# Patient Record
Sex: FEMALE
[De-identification: names, ages, dates, MRNs, and addresses within clinical notes are randomized per-mention and may not be internally consistent; named-entity substitution may affect disease eponyms.]

---

## 2017-01-23 ENCOUNTER — HOSPITAL ENCOUNTER (OUTPATIENT)
Dept: HOSPITAL 75 - RAD | Age: 32
End: 2017-01-23
Attending: FAMILY MEDICINE
Payer: COMMERCIAL

## 2017-01-23 DIAGNOSIS — Z34.81: Primary | ICD-10-CM

## 2017-01-23 PROCEDURE — 76817 TRANSVAGINAL US OBSTETRIC: CPT

## 2017-01-23 PROCEDURE — 76801 OB US < 14 WKS SINGLE FETUS: CPT

## 2017-01-23 NOTE — DIAGNOSTIC IMAGING REPORT
EXAMINATION: OB ultrasound.



INDICATION: Check size and dates.



FINDINGS: There are no recent ultrasound examinations available

for comparison.



There is a gestational sac within the uterus containing a single

live fetus. Fetal heart motion is noted and a rate of 181 bpm is

recorded. The crown-rump length suggests the estimated

gestational age is 8 weeks 6 days +/- 1 week. There are no

obvious fetal abnormalities identified.



The amniotic fluid volume is within normal limits. At this time,

it is not certain where the placenta will develop.



Both ovaries are identified and are unremarkable. There is no

solid pelvic mass or free fluid collection noted.



IMPRESSION:

1. There is single live intrauterine pregnancy of approximately

8 weeks 6 days gestation +/- 1 week. The EDC is August 29, 2017.

2. There are no obvious fetal abnormalities identified. If a

more sensitive evaluation of the fetal anatomy is desired, then

a short-term (10-12 weeks) follow-up ultrasound exam should be

obtained.



Dictated by:



Dictated on workstation # [id number]

## 2017-04-07 ENCOUNTER — HOSPITAL ENCOUNTER (OUTPATIENT)
Dept: HOSPITAL 75 - RAD | Age: 32
End: 2017-04-07
Attending: FAMILY MEDICINE
Payer: COMMERCIAL

## 2017-04-07 DIAGNOSIS — Z3A.19: ICD-10-CM

## 2017-04-07 DIAGNOSIS — Z13.0: ICD-10-CM

## 2017-04-07 DIAGNOSIS — Z34.82: Primary | ICD-10-CM

## 2017-04-07 PROCEDURE — 76805 OB US >/= 14 WKS SNGL FETUS: CPT

## 2017-04-07 NOTE — DIAGNOSTIC IMAGING REPORT
INDICATION: Fetal assessment.



FINDINGS: Obstetrical ultrasonography reveals boucher

intrauterine gestation in transverse presentation. Amniotic

fluid level is unremarkable. There is fetal cardiac activity

present with a rate of 161 beats per minute. The placenta is

posterior and in a low position. No definite previa is

documented. No fetal anomaly is identified however four-chamber

view of the heart and three-vessel cord could not be documented.

Fetal biometry indicates gestational age of 19 weeks and 4 days.

This corresponds with age predicted by ultrasound examination of

01/23/2017.



IMPRESSION: Unremarkable obstetrical ultrasound with estimated

gestational age of 19 weeks and 3 days. There has been normal

progression since previous study. Additional anatomic survey

could be performed later in the second trimester for assessment

of the heart and umbilical cord.



Dictated by:



Dictated on workstation # [id number]

## 2017-05-10 ENCOUNTER — HOSPITAL ENCOUNTER (OUTPATIENT)
Dept: HOSPITAL 75 - RAD | Age: 32
End: 2017-05-10
Attending: FAMILY MEDICINE
Payer: COMMERCIAL

## 2017-05-10 DIAGNOSIS — Z34.92: Primary | ICD-10-CM

## 2017-05-10 DIAGNOSIS — Z36: ICD-10-CM

## 2017-05-10 PROCEDURE — 76816 OB US FOLLOW-UP PER FETUS: CPT

## 2017-05-10 NOTE — DIAGNOSTIC IMAGING REPORT
INDICATION: Incomplete fetal survey on previous study of

4/7/2017.



COMPARISON: Limited study performed and compared with 4/7/2017.



FINDINGS: On the prior study, the four-chamber heart view and

three-vessel cord could not be visualized. On today's study,

normal-appearing four-chamber heart view and three-vessel cord

were seen. Fetal heart rate is 130 beats per minute. The fetus is

in breech presentation at this time.



IMPRESSION:



Limited study performed to evaluate the structures which were not

well seen on the previous study of 4/7/2017. On today's study,

four-chamber heart view was unremarkable and there was a

three-vessel cord detected. The rest of the survey was not

repeated. Fetal heart rate is 130 beats per minute.



Dictated by: 



  Dictated on workstation # [id number]

## 2017-06-01 ENCOUNTER — HOSPITAL ENCOUNTER (OUTPATIENT)
Dept: HOSPITAL 75 - LAB | Age: 32
End: 2017-06-01
Attending: FAMILY MEDICINE
Payer: COMMERCIAL

## 2017-06-01 DIAGNOSIS — O99.810: Primary | ICD-10-CM

## 2017-06-01 PROCEDURE — 82962 GLUCOSE BLOOD TEST: CPT

## 2017-06-01 PROCEDURE — 36415 COLL VENOUS BLD VENIPUNCTURE: CPT

## 2017-06-01 PROCEDURE — 82951 GLUCOSE TOLERANCE TEST (GTT): CPT

## 2017-06-01 PROCEDURE — 82952 GTT-ADDED SAMPLES: CPT

## 2017-06-13 ENCOUNTER — HOSPITAL ENCOUNTER (OUTPATIENT)
Dept: HOSPITAL 75 - RAD | Age: 32
End: 2017-06-13
Attending: FAMILY MEDICINE
Payer: COMMERCIAL

## 2017-06-13 DIAGNOSIS — O44.43: Primary | ICD-10-CM

## 2017-06-13 DIAGNOSIS — Z3A.30: ICD-10-CM

## 2017-06-13 PROCEDURE — 76816 OB US FOLLOW-UP PER FETUS: CPT

## 2017-06-13 NOTE — DIAGNOSTIC IMAGING REPORT
INDICATION: Low-lying placenta, followup.



TECHNIQUE: Multiple real-time grayscale images were obtained over

the gravid uterus.



COMPARISON: 05/10/2017.



FINDINGS: Cervical length is approximately 6.7 cm. Fetus is

currently in a cephalic presentation. Placenta is posterior and

without previa. Normal amount of amniotic fluid.



Biometrical measurements are as follows:

Biparietal 7.8 cm, age 31 weeks 3 days.

Head circumference 29.1 cm, age 32 weeks 1 days.

Abdominal circumference 24.9 cm, age 29 weeks 1 days.

Femur length 5.7 cm, age 29 weeks 6 days.



Sonographic estimate age: 30 weeks 5 days.

Sonographic estimated date of delivery: 08-17-17.



Estimated Fetal Weight: 1458 gm (+/- 213  gm).

LMP percentile: 40%.



Fetal heart rate: 149 beats per minute.



Fetal number: 1 of 1.



IMPRESSION: Single viable intrauterine pregnancy, currently in a

cephalic presentation. Sonographically estimated age is 30 weeks

5 days for an estimated date of delivery of August 17, 2017. This

is approximately 12 days advanced compared to initial baseline

ultrasound imaging.



Dictated by: 



  Dictated on workstation # [id number]

## 2017-08-23 ENCOUNTER — HOSPITAL ENCOUNTER (INPATIENT)
Dept: HOSPITAL 75 - LDRP | Age: 32
LOS: 1 days | Discharge: HOME | End: 2017-08-24
Attending: FAMILY MEDICINE | Admitting: FAMILY MEDICINE
Payer: COMMERCIAL

## 2017-08-23 VITALS — DIASTOLIC BLOOD PRESSURE: 68 MMHG | SYSTOLIC BLOOD PRESSURE: 120 MMHG

## 2017-08-23 VITALS — SYSTOLIC BLOOD PRESSURE: 126 MMHG | DIASTOLIC BLOOD PRESSURE: 71 MMHG

## 2017-08-23 VITALS — DIASTOLIC BLOOD PRESSURE: 74 MMHG | SYSTOLIC BLOOD PRESSURE: 124 MMHG

## 2017-08-23 VITALS — SYSTOLIC BLOOD PRESSURE: 102 MMHG | DIASTOLIC BLOOD PRESSURE: 55 MMHG

## 2017-08-23 VITALS — DIASTOLIC BLOOD PRESSURE: 69 MMHG | SYSTOLIC BLOOD PRESSURE: 121 MMHG

## 2017-08-23 VITALS — WEIGHT: 190.25 LBS | HEIGHT: 61 IN | BODY MASS INDEX: 35.92 KG/M2

## 2017-08-23 VITALS — DIASTOLIC BLOOD PRESSURE: 66 MMHG | SYSTOLIC BLOOD PRESSURE: 119 MMHG

## 2017-08-23 VITALS — DIASTOLIC BLOOD PRESSURE: 58 MMHG | SYSTOLIC BLOOD PRESSURE: 125 MMHG

## 2017-08-23 VITALS — DIASTOLIC BLOOD PRESSURE: 70 MMHG | SYSTOLIC BLOOD PRESSURE: 121 MMHG

## 2017-08-23 VITALS — SYSTOLIC BLOOD PRESSURE: 124 MMHG | DIASTOLIC BLOOD PRESSURE: 58 MMHG

## 2017-08-23 VITALS — DIASTOLIC BLOOD PRESSURE: 70 MMHG | SYSTOLIC BLOOD PRESSURE: 131 MMHG

## 2017-08-23 VITALS — DIASTOLIC BLOOD PRESSURE: 70 MMHG | SYSTOLIC BLOOD PRESSURE: 113 MMHG

## 2017-08-23 VITALS — DIASTOLIC BLOOD PRESSURE: 69 MMHG | SYSTOLIC BLOOD PRESSURE: 119 MMHG

## 2017-08-23 VITALS — SYSTOLIC BLOOD PRESSURE: 114 MMHG | DIASTOLIC BLOOD PRESSURE: 62 MMHG

## 2017-08-23 VITALS — DIASTOLIC BLOOD PRESSURE: 74 MMHG | SYSTOLIC BLOOD PRESSURE: 131 MMHG

## 2017-08-23 VITALS — SYSTOLIC BLOOD PRESSURE: 111 MMHG | DIASTOLIC BLOOD PRESSURE: 65 MMHG

## 2017-08-23 VITALS — SYSTOLIC BLOOD PRESSURE: 114 MMHG | DIASTOLIC BLOOD PRESSURE: 60 MMHG

## 2017-08-23 VITALS — SYSTOLIC BLOOD PRESSURE: 113 MMHG | DIASTOLIC BLOOD PRESSURE: 64 MMHG

## 2017-08-23 VITALS — DIASTOLIC BLOOD PRESSURE: 82 MMHG | SYSTOLIC BLOOD PRESSURE: 114 MMHG

## 2017-08-23 VITALS — SYSTOLIC BLOOD PRESSURE: 125 MMHG | DIASTOLIC BLOOD PRESSURE: 73 MMHG

## 2017-08-23 VITALS — SYSTOLIC BLOOD PRESSURE: 119 MMHG | DIASTOLIC BLOOD PRESSURE: 65 MMHG

## 2017-08-23 VITALS — SYSTOLIC BLOOD PRESSURE: 112 MMHG | DIASTOLIC BLOOD PRESSURE: 62 MMHG

## 2017-08-23 VITALS — DIASTOLIC BLOOD PRESSURE: 69 MMHG | SYSTOLIC BLOOD PRESSURE: 124 MMHG

## 2017-08-23 VITALS — DIASTOLIC BLOOD PRESSURE: 63 MMHG | SYSTOLIC BLOOD PRESSURE: 120 MMHG

## 2017-08-23 VITALS — SYSTOLIC BLOOD PRESSURE: 114 MMHG | DIASTOLIC BLOOD PRESSURE: 64 MMHG

## 2017-08-23 VITALS — SYSTOLIC BLOOD PRESSURE: 113 MMHG | DIASTOLIC BLOOD PRESSURE: 63 MMHG

## 2017-08-23 VITALS — SYSTOLIC BLOOD PRESSURE: 125 MMHG | DIASTOLIC BLOOD PRESSURE: 67 MMHG

## 2017-08-23 VITALS — DIASTOLIC BLOOD PRESSURE: 66 MMHG | SYSTOLIC BLOOD PRESSURE: 114 MMHG

## 2017-08-23 VITALS — SYSTOLIC BLOOD PRESSURE: 134 MMHG | DIASTOLIC BLOOD PRESSURE: 77 MMHG

## 2017-08-23 VITALS — DIASTOLIC BLOOD PRESSURE: 75 MMHG | SYSTOLIC BLOOD PRESSURE: 119 MMHG

## 2017-08-23 VITALS — SYSTOLIC BLOOD PRESSURE: 131 MMHG | DIASTOLIC BLOOD PRESSURE: 70 MMHG

## 2017-08-23 VITALS — SYSTOLIC BLOOD PRESSURE: 124 MMHG | DIASTOLIC BLOOD PRESSURE: 72 MMHG

## 2017-08-23 VITALS — SYSTOLIC BLOOD PRESSURE: 118 MMHG | DIASTOLIC BLOOD PRESSURE: 68 MMHG

## 2017-08-23 VITALS — SYSTOLIC BLOOD PRESSURE: 111 MMHG | DIASTOLIC BLOOD PRESSURE: 64 MMHG

## 2017-08-23 VITALS — DIASTOLIC BLOOD PRESSURE: 72 MMHG | SYSTOLIC BLOOD PRESSURE: 124 MMHG

## 2017-08-23 VITALS — SYSTOLIC BLOOD PRESSURE: 126 MMHG | DIASTOLIC BLOOD PRESSURE: 68 MMHG

## 2017-08-23 VITALS — DIASTOLIC BLOOD PRESSURE: 71 MMHG | SYSTOLIC BLOOD PRESSURE: 114 MMHG

## 2017-08-23 VITALS — DIASTOLIC BLOOD PRESSURE: 55 MMHG | SYSTOLIC BLOOD PRESSURE: 112 MMHG

## 2017-08-23 VITALS — SYSTOLIC BLOOD PRESSURE: 139 MMHG | DIASTOLIC BLOOD PRESSURE: 63 MMHG

## 2017-08-23 VITALS — SYSTOLIC BLOOD PRESSURE: 105 MMHG | DIASTOLIC BLOOD PRESSURE: 57 MMHG

## 2017-08-23 VITALS — DIASTOLIC BLOOD PRESSURE: 67 MMHG | SYSTOLIC BLOOD PRESSURE: 113 MMHG

## 2017-08-23 VITALS — DIASTOLIC BLOOD PRESSURE: 69 MMHG | SYSTOLIC BLOOD PRESSURE: 117 MMHG

## 2017-08-23 VITALS — SYSTOLIC BLOOD PRESSURE: 128 MMHG | DIASTOLIC BLOOD PRESSURE: 68 MMHG

## 2017-08-23 VITALS — SYSTOLIC BLOOD PRESSURE: 115 MMHG | DIASTOLIC BLOOD PRESSURE: 66 MMHG

## 2017-08-23 VITALS — DIASTOLIC BLOOD PRESSURE: 60 MMHG | SYSTOLIC BLOOD PRESSURE: 131 MMHG

## 2017-08-23 VITALS — DIASTOLIC BLOOD PRESSURE: 68 MMHG | SYSTOLIC BLOOD PRESSURE: 126 MMHG

## 2017-08-23 VITALS — DIASTOLIC BLOOD PRESSURE: 71 MMHG | SYSTOLIC BLOOD PRESSURE: 121 MMHG

## 2017-08-23 VITALS — SYSTOLIC BLOOD PRESSURE: 113 MMHG | DIASTOLIC BLOOD PRESSURE: 62 MMHG

## 2017-08-23 DIAGNOSIS — O13.3: Primary | ICD-10-CM

## 2017-08-23 DIAGNOSIS — Z3A.39: ICD-10-CM

## 2017-08-23 LAB
BASOPHILS # BLD AUTO: 0 10^3/UL (ref 0–0.1)
BASOPHILS NFR BLD AUTO: 0 % (ref 0–10)
EOSINOPHIL # BLD AUTO: 0.1 10^3/UL (ref 0–0.3)
EOSINOPHIL NFR BLD AUTO: 1 % (ref 0–10)
ERYTHROCYTE [DISTWIDTH] IN BLOOD BY AUTOMATED COUNT: 16.7 % (ref 10–14.5)
LYMPHOCYTES # BLD AUTO: 2.3 X 10^3 (ref 1–4)
LYMPHOCYTES NFR BLD AUTO: 22 % (ref 12–44)
MCH RBC QN AUTO: 27 PG (ref 25–34)
MCHC RBC AUTO-ENTMCNC: 33 G/DL (ref 32–36)
MCV RBC AUTO: 81 FL (ref 80–99)
MONOCYTES # BLD AUTO: 1.1 X 10^3 (ref 0–1)
MONOCYTES NFR BLD AUTO: 11 % (ref 0–12)
NEUTROPHILS # BLD AUTO: 6.7 X 10^3 (ref 1.8–7.8)
NEUTROPHILS NFR BLD AUTO: 66 % (ref 42–75)
PLATELET # BLD: 190 10^3/UL (ref 130–400)
PMV BLD AUTO: 9.4 FL (ref 7.4–10.4)
RBC # BLD AUTO: 4.6 10^6/UL (ref 4.35–5.85)
WBC # BLD AUTO: 10.2 10^3/UL (ref 4.3–11)

## 2017-08-23 PROCEDURE — 86901 BLOOD TYPING SEROLOGIC RH(D): CPT

## 2017-08-23 PROCEDURE — 86900 BLOOD TYPING SEROLOGIC ABO: CPT

## 2017-08-23 PROCEDURE — 85025 COMPLETE CBC W/AUTO DIFF WBC: CPT

## 2017-08-23 PROCEDURE — 36415 COLL VENOUS BLD VENIPUNCTURE: CPT

## 2017-08-23 PROCEDURE — 0HQ9XZZ REPAIR PERINEUM SKIN, EXTERNAL APPROACH: ICD-10-PCS | Performed by: FAMILY MEDICINE

## 2017-08-23 PROCEDURE — 86850 RBC ANTIBODY SCREEN: CPT

## 2017-08-23 RX ADMIN — SODIUM CHLORIDE, SODIUM LACTATE, POTASSIUM CHLORIDE, CALCIUM CHLORIDE, AND DEXTROSE MONOHYDRATE SCH MLS/HR: 600; 310; 30; 20; 5 INJECTION, SOLUTION INTRAVENOUS at 14:31

## 2017-08-23 RX ADMIN — IBUPROFEN SCH MG: 600 TABLET ORAL at 22:27

## 2017-08-23 RX ADMIN — SODIUM CHLORIDE, SODIUM LACTATE, POTASSIUM CHLORIDE, CALCIUM CHLORIDE, AND DEXTROSE MONOHYDRATE SCH MLS/HR: 600; 310; 30; 20; 5 INJECTION, SOLUTION INTRAVENOUS at 06:35

## 2017-08-23 NOTE — OB LABOR & DELIVERY RECORD
Vag Delivery Note


Vag Delivery Note


Date of Delivery: 17 





Preoperative Diagnosis: Jennyfer Hernandez  is a (30  /Para  2 / 1,

Gestational Age (wks)39with 6d





Postoperative Diagnosis: Same


Surgeon: OSCAR CORMIER 





Assistant: Tita Yates, MS3





Anesthesia: epidural





Delivery Type: spontaneous vaginal





Findings: []


Viable male infant, apgars 8/9, weight 9#0


Lacerations: first degree perineal, bilateral periurethral abrasions


Intact placenta with 3 vessel cord. No nuchal cord, body cord or shoulder 

dystocia





Estimated Blood Loss: 250 ml





Complications: None





Condition: Stable





Description of Procedure:





The patient is a 33 yo G2 now P2 who presented for IOL. She was admitted and 

informed consent was obtained. Her labor course was unremarkable. She 

progressed to complete dilatation and began to push. 





She was then set up for delivery. The infant's head was delivered 

atraumatically in the JAD position. The shoulders and remainder of the infant's 

body were then delivered without difficulty. Upon delivery, the infant was 

vigorous and placed on maternal abdomen. After a brief delay the cord was 

doubly clamped and cut and the infant was handed off to the pediatric staff. An 

intact placenta with 3-vessel cord delivered via Teresa and there was found to 

be minimal bleeding.~ Vigorous fundal massage was performed and the fundus was 

found to be firm. IV oxytocin was given. Examination of the vagina and perineum 

revealed a first degree perineal laceration repaired in the usual fashion with 3

-0 rapide suture. Following the repair, sponge, instrument and needle counts 

were correct. Mom and baby were both in stable condition in the labor suite.





Vitals - Labs


Vital Signs - I&O





Vital Signs








 17





 14:15 16:00


 


Temp 96.4 


 


Pulse  72


 


Resp  18


 


B/P (MAP)  125/73


 


Pulse Ox  100


 


O2 Delivery  Room Air











Labs


Laboratory Tests


17 06:46: 


White Blood Count 10.2, Red Blood Count 4.60, Hemoglobin 12.3, Hematocrit 37, 

Mean Corpuscular Volume 81, Mean Corpuscular Hemoglobin 27, Mean Corpuscular 

Hemoglobin Concent 33, Red Cell Distribution Width 16.7H, Platelet Count 190, 

Mean Platelet Volume 9.4, Neutrophils (%) (Auto) 66, Lymphocytes (%) (Auto) 22, 

Monocytes (%) (Auto) 11, Eosinophils (%) (Auto) 1, Basophils (%) (Auto) 0, 

Neutrophils # (Auto) 6.7, Lymphocytes # (Auto) 2.3, Monocytes # (Auto) 1.1H, 

Eosinophils # (Auto) 0.1, Basophils # (Auto) 0.0











OSCAR CORMIER MD Aug 23, 2017 6:09 pm

## 2017-08-23 NOTE — XMS REPORT
[facility]

 Created on: 2016



Jennyfer Aaron

External Reference #: [id number]

: 1985

Sex: Female



Demographics







 Address  [address] [place]  [address]

 

 Home Phone  [phone]

 

 Preferred Language  Unknown

 

 Marital Status  Unknown

 

 Taoism Affiliation  Unknown

 

 Race  White

 

 Ethnic Group   or 





Author







 Author  NILAM MCCLAIN

 

 Organization  [employer]

 

 Address  Unknown

 

 Phone  Unavailable







Care Team Providers







 Care Team Member Name  Role  Phone

 

 NILAM MCCLAIN  CP  Unavailable



                                                                



Allergies, Adverse Reactions, Alerts

          





 Substance  Reaction  Event Type

 

 N.K.D.A.  Info Not Available  Non Drug Allergy



                                                                               
         



Problems

          





 Problem Type  Condition  Code  Onset Dates  Condition Status

 

 Problem  Pregnant state, incidental  V22.2     Active

 

 Problem  Unspecified pruritic disorder  698.9     Active

 

 Problem  Rash and other nonspecific skin eruption  782.1     Active

 

 Problem  Acute sinusitis, unspecified  461.9     Active

 

 Problem  Screening examination for venereal disease  V74.5     Active

 

 Problem  Surveillance of other previously prescribed contraceptive method  
V25.49     Active

 

 Problem  Screening examination for pulmonary tuberculosis  V74.1     Active

 

 Problem  Ingrowing nail  703.0     Active

 

 Problem  Encounter for dental examination and cleaning without abnormal 
findings  Z01.20     Active

 

 Problem  Routine postpartum follow-up  V24.2     Active

 

 Problem  Screening for malignant neoplasm of the cervix  V76.2     Active

 

 Problem  Other malaise and fatigue  780.79     Active

 

 Problem  Unspecified contraceptive management  V25.9     Active

 

 Problem  Dysuria  788.1     Active

 

 Problem  Elevated blood pressure reading without diagnosis of hypertension  
796.2     Active

 

 Problem  Unspecified breast screening  V76.10     Active

 

 Problem  Routine gynecological examination  V72.31     Active

 

 Problem  Unspecified constipation  564.00     Active

 

 Problem  Abdominal pain, left lower quadrant  789.04     Active

 

 Problem  General counseling for prescription of oral contraceptives  V25.01   
  Active

 

 Problem  Acute upper respiratory infections of unspecified site  465.9     
Active

 

 Assessment  Encounter for dental examination and cleaning without abnormal 
findings  Z01.20     Active

 

 Problem  Unspecified antepartum hemorrhage, unspecified as to episode of care  
641.90     Active

 

 Problem  Headache  784.0     Active



                                                                               
                                                                               
                                                                               
                                                                               
  



Medications

          No Known Medications                                                 
                                       



Procedures

          





 Procedure  Coding System  Code  Date

 

 INTRAORL-PERIAPICAL 1 FILM 69265  CPT-4    Dec 30, 2015

 

 PROPHYLAXIS - ADULT  CPT-4    Dec 30, 2015

 

 PERIODIC ORAL EXAMINATION  CPT-4    Dec 30, 2015



                                                                               
                                       



Vital Signs

          





 Date/Time:  Dec 30, 2015

 

 Blood Pressure Diastolic  71 mmHg

 

 Blood Pressure Systolic  115 mmHg

 

 Height  60 in



                                                                              



Results

          No Known Results                                                     
               



Summary Purpose

          [employer] Submission

## 2017-08-23 NOTE — XMS REPORT
Continuity of Care Document

 Created on: 2017



BRANDON MAO

External Reference #: [id number]

: 1985

Sex: Female



Demographics







 Address  [address] [place] [address]  [address]

 

 Home Phone  [phone] x

 

 Preferred Language  Unknown

 

 Marital Status  Unknown

 

 Worship Affiliation  Unknown

 

 Race  Unknown

 

 Ethnic Group  Unknown





Author







 Author  [facility]

 

 Organization  [facility]

 

 Address  Unknown

 

 Phone  Unavailable



                                                      



Allergies

                      





 Active                    Description                    Code                  
  Type                    Severity                    Reaction                  
  Onset                    Reported/Identified                    Relationship 
to Patient                    Clinical Status                

 

 Yes                    No Known Drug Allergies                    [id number]  
                  Drug Allergy                    Unknown                    N/
A                                         2012                           
                               



                                                                               
         



Medications

                                                                               
         



Problems

                      





 Date Dx Coded                    Attending                    Type            
        Code                    Diagnosis                    Diagnosed By      
          

 

 2011                                                              V04.81
                    Flu Dx (3 Yrs And Above, Im)                               
      

 

 2011                                                              V22.0 
                   Pregnancy, Normal First                                     

 

 2011                                                              V04.81
                    Flu Dx (3 Yrs And Above, Im)                               
      

 

 2011                                                              V22.0 
                   Pregnancy, Normal First                                     

 

 2011                    RAJOTTE APRN, SHWETA A                          
               V04.81                    Flu Dx (3 Yrs And Above, Im)          
                           

 

 2011                    RAJOTTE APRN, SHWETA A                          
               V22.0                    Pregnancy, Normal First                
                     

 

 2011                    RAJOTTE APRN, SHWETA A                          
               V04.81                    Flu Dx (3 Yrs And Above, Im)          
                           

 

 2011                    RAJOTTE APRN, SHWETA A                          
               V22.0                    Pregnancy, Normal First                
                     

 

 2011                    COSME DO, SONIA K                                
         V04.81                    Flu Dx (3 Yrs And Above, Im)                
                     

 

 2011                    COSME DO, SONIA K                                
         V22.0                    Pregnancy, Normal First                      
               

 

 2011                    RAJOTTE APRN, SHWETA A                          
               V04.81                    Flu Dx (3 Yrs And Above, Im)          
                           

 

 2011                    RAJOTTE APRN, SHWETA A                          
               V22.0                    Pregnancy, Normal First                
                     

 

 2011                    DONELL APRN, ANA A                            
             V04.81                    Flu Dx (3 Yrs And Above, Im)            
                         

 

 2011                    DONELL APRN, NAA A                            
             V22.0                    Pregnancy, Normal First                  
                   

 

 2011                                                              V72.31
                    Gyn Exam, Routine                                     

 

 2011                                                              V74.5 
                   Std Screen                                     

 

 2011                                                              V72.31
                    Gyn Exam, Routine                                     

 

 2011                                                              V74.5 
                   Std Screen                                     

 

 2011                    RAJOTTE APRN, SHWETA A                          
               V72.31                    Gyn Exam, Routine                     
                

 

 2011                    RAJOTTE APRN, SHWETA A                          
               V74.5                    Std Screen                             
        

 

 2011                    RAJOTTE APRN, SHWETA A                          
               V72.31                    Gyn Exam, Routine                     
                

 

 2011                    RAJOTTE APRN, SHWETA A                          
               V74.5                    Std Screen                             
        

 

 2011                    SONIA COSME DO                                
         V72.31                    Gyn Exam, Routine                           
          

 

 2011                    COSME SONIA PEREZ K                                
         V74.5                    Std Screen                                   
  

 

 2011                    RAJOTTE APRN, SHWETA A                          
               V72.31                    Gyn Exam, Routine                     
                

 

 2011                    RAJOTTE APRN, SHWETA A                          
               V74.5                    Std Screen                             
        

 

 2011                    DONELL APRROSY, ANA A                            
             V72.31                    Gyn Exam, Routine                       
              

 

 2011                    DONELL APRN, ANA A                            
             V74.5                    Std Screen                               
      

 

 2012                                                              641.90
                    Compl Of Pregnancy - Bleeding                              
       

 

 2012                                                              641.90
                    Compl Of Pregnancy - Bleeding                              
       

 

 2012                    RAJOTTE APRN, SHWETA A                          
               641.90                    Compl Of Pregnancy - Bleeding         
                            

 

 2012                    RAJOTTE APRN, SHWETA A                          
               641.90                    Compl Of Pregnancy - Bleeding         
                            

 

 2012                    SONIA COSME DO                                
         641.90                    Compl Of Pregnancy - Bleeding               
                      

 

 2012                    RAJOTTE APRN, SHWETA A                          
               641.90                    Compl Of Pregnancy - Bleeding         
                            

 

 2012                    DONELL APRN, ANA A                            
             641.90                    Compl Of Pregnancy - Bleeding           
                          

 

 2012                                                              698.9 
                   Pruritus Nos                                     

 

 2012                                                              782.1 
                   Rash                                     

 

 2012                                                              784.0 
                   Headache                                     

 

 2012                                                              V22.2 
                   Pregnant Incidental                                     

 

 2012                                                              698.9 
                   Pruritus Nos                                     

 

 2012                                                              782.1 
                   Rash                                     

 

 2012                                                              784.0 
                   Headache                                     

 

 2012                                                              V22.2 
                   Pregnant Incidental                                     

 

 2012                    RAJOTTE APRN, SHWETA A                          
               698.9                    Pruritus Nos                           
          

 

 2012                    RAJOTTE APRN, SHWETA A                          
               782.1                    Rash                                   
  

 

 2012                    RAJOTTE APRN, SHWETA A                          
               784.0                    Headache                               
      

 

 2012                    RAJOTTE APRN, SHWETA A                          
               V22.2                    Pregnant Incidental                    
                 

 

 2012                    RAJOTTE APRN, SHWETA A                          
               698.9                    Pruritus Nos                           
          

 

 2012                    RAJOTTE APRN, SHWETA A                          
               782.1                    Rash                                   
  

 

 2012                    RAJOTTE APRN, SHWETA A                          
               784.0                    Headache                               
      

 

 2012                    RAJOTTE APRN, SHWETA A                          
               V22.2                    Pregnant Incidental                    
                 

 

 2012                    COSME DO, SONIA K                                
         698.9                    Pruritus Nos                                 
    

 

 2012                    COSME DO, SONIA K                                
         782.1                    Rash                                     

 

 2012                    COSME DO, SONIA K                                
         784.0                    Headache                                     

 

 2012                    COSME DO, SONIA K                                
         V22.2                    Pregnant Incidental                          
           

 

 2012                    RAJOTTE APRN, SHWETA A                          
               698.9                    Pruritus Nos                           
          

 

 2012                    RAJOTTE APRN, SHWETA A                          
               782.1                    Rash                                   
  

 

 2012                    RAJOTTE APRN, SHWETA A                          
               784.0                    Headache                               
      

 

 2012                    RAJOTTE APRN, SHWETA A                          
               V22.2                    Pregnant Incidental                    
                 

 

 2012                    DONELL APRN, ANA A                            
             698.9                    Pruritus Nos                             
        

 

 2012                    DONELL APRN, ANA A                            
             782.1                    Rash                                     

 

 2012                    DONELL APRN, ANA A                            
             784.0                    Headache                                 
    

 

 2012                    DONELL APRN, ANA A                            
             V22.2                    Pregnant Incidental                      
               

 

 2012                                                              796.2 
                   ELEVATED BLOOD PRESSURE READING WITHOUT DIAGNOSIS OF 
HYPERTENSION                                     

 

 2012                                                              796.2 
                   ELEVATED BLOOD PRESSURE READING WITHOUT DIAGNOSIS OF 
HYPERTENSION                                     

 

 2012                    RAJALEKE APRN, SHWETA A                          
               796.2                    ELEVATED BLOOD PRESSURE READING WITHOUT 
DIAGNOSIS OF HYPERTENSION                                     

 

 2012                    RAJOTTE APRN, SHWETA A                          
               796.2                    ELEVATED BLOOD PRESSURE READING WITHOUT 
DIAGNOSIS OF HYPERTENSION                                     

 

 2012                    COSME DO, SONIA K                                
         796.2                    ELEVATED BLOOD PRESSURE READING WITHOUT 
DIAGNOSIS OF HYPERTENSION                                     

 

 2012                    RAJOTTE APRN, SHWETA A                          
               796.2                    ELEVATED BLOOD PRESSURE READING WITHOUT 
DIAGNOSIS OF HYPERTENSION                                     

 

 2012                    DONELL APRN, ANA A                            
             796.2                    ELEVATED BLOOD PRESSURE READING WITHOUT 
DIAGNOSIS OF HYPERTENSION                                     

 

 2012                                                              780.79
                    fatigue                                     

 

 2012                                                              V24.2 
                   POSTPARTUM F/U, ROUTINE                                     

 

 2012                                                              V25.49
                    CONTRACEPTION SURVEILLANCE (REPEAT RX)                     
                

 

 2012                                                              V25.9 
                   CONTRACEPTION MANAGEMENT                                     

 

 2012                                                              V76.2 
                   CERVICAL CANCER SCREENING (PAP SMEAR)                       
              

 

 2012                                                              780.79
                    fatigue                                     

 

 2012                                                              V24.2 
                   POSTPARTUM F/U, ROUTINE                                     

 

 2012                                                              V25.49
                    CONTRACEPTION SURVEILLANCE (REPEAT RX)                     
                

 

 2012                                                              V25.9 
                   CONTRACEPTION MANAGEMENT                                     

 

 2012                                                              V76.2 
                   CERVICAL CANCER SCREENING (PAP SMEAR)                       
              

 

 2012                    FACUNDO WU SHWETA A                          
               780.79                    fatigue                               
      

 

 2012                    FACUNDO WU SHWETA A                          
               V24.2                    POSTPARTUM F/U, ROUTINE                
                     

 

 2012                    FACUNDO WU SHWETA A                          
               V25.49                    CONTRACEPTION SURVEILLANCE (REPEAT RX)
                                     

 

 2012                    FACUNDO WU SHWETA A                          
               V25.9                    CONTRACEPTION MANAGEMENT               
                      

 

 2012                    FACUNDO WU SHWETA A                          
               V76.2                    CERVICAL CANCER SCREENING (PAP SMEAR)  
                                   

 

 2012                    FACUNDO WU SHWETA A                          
               780.79                    fatigue                               
      

 

 2012                    FACUNDO WU SHWETA A                          
               V24.2                    POSTPARTUM F/U, ROUTINE                
                     

 

 2012                    FACUNDO WU SHWETA A                          
               V25.49                    CONTRACEPTION SURVEILLANCE (REPEAT RX)
                                     

 

 2012                    FACUNDO WU SHWETA A                          
               V25.9                    CONTRACEPTION MANAGEMENT               
                      

 

 2012                    FACUNDO WU SHWETA A                          
               V76.2                    CERVICAL CANCER SCREENING (PAP SMEAR)  
                                   

 

 2012                    COSME DO SONIA K                                
         780.79                    fatigue                                     

 

 2012                    COSME DO SONIA K                                
         V24.2                    POSTPARTUM F/U, ROUTINE                      
               

 

 2012                    COSME DO SONIA K                                
         V25.49                    CONTRACEPTION SURVEILLANCE (REPEAT RX)      
                               

 

 2012                    COSME DO SONIA K                                
         V25.9                    CONTRACEPTION MANAGEMENT                     
                

 

 2012                    COSME DO SONIA K                                
         V76.2                    CERVICAL CANCER SCREENING (PAP SMEAR)        
                             

 

 2012                    FACUNDO WU SHWETA A                          
               780.79                    fatigue                               
      

 

 2012                    FACUNDO WU SHWETA A                          
               V24.2                    POSTPARTUM F/U, ROUTINE                
                     

 

 2012                    FACUNDO WU SHWETA A                          
               V25.49                    CONTRACEPTION SURVEILLANCE (REPEAT RX)
                                     

 

 2012                    FACUNDO WU SHWETA A                          
               V25.9                    CONTRACEPTION MANAGEMENT               
                      

 

 2012                    SHWETA KRAMER                          
               V76.2                    CERVICAL CANCER SCREENING (PAP SMEAR)  
                                   

 

 2012                    ANA LUZ                            
             780.79                    fatigue                                 
    

 

 2012                    ANA LUZ                            
             V24.2                    POSTPARTUM F/U, ROUTINE                  
                   

 

 2012                    ANA LUZ                            
             V25.49                    CONTRACEPTION SURVEILLANCE (REPEAT RX)  
                                   

 

 2012                    ANA LUZ                            
             V25.9                    CONTRACEPTION MANAGEMENT                 
                    

 

 2012                    ANA LUZ                            
             V76.2                    CERVICAL CANCER SCREENING (PAP SMEAR)    
                                 

 

 2012                                                              788.1 
                   DYSURIA                                     

 

 2012                                                              788.1 
                   DYSURIA                                     

 

 2012                    SHWETA KRAMER                          
               788.1                    DYSURIA                                
     

 

 2012                    SHWETA KRAMER                          
               788.1                    DYSURIA                                
     

 

 2012                    SONIA COSME DO                                
         788.1                    DYSURIA                                     

 

 2012                    SHWETA KRAMER                          
               788.1                    DYSURIA                                
     

 

 2012                    ANA LUZ                            
             788.1                    DYSURIA                                  
   

 

 2012                                                              V25.01
                    CONTRACEPTION - ORAL CONTRACEPTION                         
            

 

 2012                                                              V25.01
                    CONTRACEPTION - ORAL CONTRACEPTION                         
            

 

 2012                    SHWETA KRAMER                          
               V25.01                    CONTRACEPTION - ORAL CONTRACEPTION    
                                 

 

 2012                    SHWETA KRAMER                          
               V25.01                    CONTRACEPTION - ORAL CONTRACEPTION    
                                 

 

 2012                    SONIA COSME DO                                
         V25.01                    CONTRACEPTION - ORAL CONTRACEPTION          
                           

 

 2012                    SHWETA KRAMER                          
               V25.01                    CONTRACEPTION - ORAL CONTRACEPTION    
                                 

 

 2012                    ANA LUZ                            
             V25.01                    CONTRACEPTION - ORAL CONTRACEPTION      
                               

 

 2013                                                              465.9 
                   UPPER RESPIRATORY INFECTION                                 
    

 

 2013                                                              465.9 
                   UPPER RESPIRATORY INFECTION                                 
    

 

 2013                    SHWETA KRAMER                          
               465.9                    UPPER RESPIRATORY INFECTION            
                         

 

 2013                    SHWETA KRAMER                          
               465.9                    UPPER RESPIRATORY INFECTION            
                         

 

 2013                    SONIA COSME DO                                
         465.9                    UPPER RESPIRATORY INFECTION                  
                   

 

 2013                    SHWETA KRAMER                          
               465.9                    UPPER RESPIRATORY INFECTION            
                         

 

 2013                    DONELL APRN, ANA A                            
             465.9                    UPPER RESPIRATORY INFECTION              
                       

 

 2013                                                              703.0 
                   INGROWING TOENAIL                                     

 

 2013                    FACUNDO BULLOCKN, SHWETA A                          
               703.0                    INGROWING TOENAIL                      
               

 

 2013                    FACUNDO APRN, SHWETA A                          
               703.0                    INGROWING TOENAIL                      
               

 

 2013                    COSME DO, SONIA K                                
         703.0                    INGROWING TOENAIL                            
         

 

 2013                    FACUNDO APRN, SHWETA A                          
               703.0                    INGROWING TOENAIL                      
               

 

 2013                    DONELL BULLOCKN, ANA A                            
             703.0                    INGROWING TOENAIL                        
             

 

 10/13/2014                    COSME DO, SONIA K                                
         V74.1                    TB SCREENING                                 
    

 

 10/13/2014                    [place] APRROSY, SHWETA A                          
               V74.1                    TB SCREENING                           
          

 

 10/13/2014                    DONELL BULLOCKROSY, ANA A                            
             V74.1                    TB SCREENING                             
        

 

 2015                    FACUNDO BULLOCKROSY, SHWETA A                          
               564.00                    CONSTIPATION                          
           

 

 2015                    [place] APRROSY, SHWTEA A                          
               789.04                    ABDOMINAL PAIN LEFT LOWER QUADRANT    
                                 

 

 2015                    DONELL APRN, ANA A                            
             564.00                    CONSTIPATION                            
         

 

 2015                    DONELLELODIA BULLOCKROSY, ANA A                            
             789.04                    ABDOMINAL PAIN LEFT LOWER QUADRANT      
                               

 

 2015                    MARTINA PETTY, SHERI VOGEL                    Ot         
           599.70                                                          

 

 2015                    MARTINA PETTY, SHERI VOGEL                    Ot         
           625.9                                                          

 

 2015                    DONELL APRN, ANA A                            
             V72.31                    GYN EXAM, ROUTINE                       
              

 

 2015                    DONELL APRN, ANA A                            
             V74.5                    STD SCREEN                               
      

 

 2015                    DONELL APRN, ANA A                            
             V76.10                    BREAST CANCER SCREENING                 
                    

 

 10/26/2016                    NATHALY PETTY, RADHA N                    Ot        
            L68.0                    HIRSUTISM                                 
    

 

 10/26/2016                    NATHALY PETTY, RADHA N                    Ot        
            N91.4                    SECONDARY OLIGOMENORRHEA                  
                   

 

 10/26/2016                    NATHALY PETTY, RADHA N                    Ot        
            L68.0                    HIRSUTISM                                 
    

 

 10/26/2016                    NATHALY PETTY, RADHA N                    Ot        
            N91.4                    SECONDARY OLIGOMENORRHEA                  
                   

 

 10/26/2016                    NATHALY PETTY, RADHA N                    Ot        
            L68.0                    HIRSUTISM                                 
    

 

 10/26/2016                    NATHALY PETTY, RADHA N                    Ot        
            N91.4                    SECONDARY OLIGOMENORRHEA                  
                   

 

 10/28/2016                    NATHALY PETTY, RADHA N                    Ot        
            L68.0                    HIRSUTISM                                 
    

 

 10/28/2016                    NATHALY PETTY, RADHA N                    Ot        
            N91.4                    SECONDARY OLIGOMENORRHEA                  
                   

 

 10/31/2016                    NATHALY PETTY, RADHA N                    Ot        
            L68.0                    HIRSUTISM                                 
    

 

 10/31/2016                    NATHALY PETTY, RADHA N                    Ot        
            N91.4                    SECONDARY OLIGOMENORRHEA                  
                   

 

 2016                    NATHALY PETTY, RADHA N                    Ot        
            L68.0                    HIRSUTISM                                 
    

 

 2016                    NATHALY PETTY, RADHA N                    Ot        
            N91.4                    SECONDARY OLIGOMENORRHEA                  
                   

 

 2017                    NATHALY PETTY, RADHA N                    Ot        
            L68.0                    HIRSUTISM                                 
    

 

 2017                    NATHALY PETTY, RADHA N                    Ot        
            N91.4                    SECONDARY OLIGOMENORRHEA                  
                   

 

 2017                    OSCAR CORMIER MD                    Ot       
             Z34.81                    ENCOUNTER FOR SUPRVSN OF NORMAL PREGNANC
                                     

 

 2017                    OSCAR CORMIER MD                    Ot       
             Z34.81                    ENCOUNTER FOR SUPRVSN OF NORMAL PREGNANC
                                     

 

 2017                    NATHALY PETTY, RADHA N                    Ot        
            L68.0                    HIRSUTISM                                 
    

 

 2017                    OUMAR ANDERSEN MDIN N                    Ot        
            N91.4                    SECONDARY OLIGOMENORRHEA                  
                   

 

 2017                    OSCAR CORMIER MD                    Ot       
             Z34.81                    ENCOUNTER FOR SUPRVSN OF NORMAL PREGNANC
                                     

 

 04/10/2017                    OSCAR CORMIER MD                    Ot       
             Z13.0                    ENCNTR SCREEN FOR DIS OF THE BLD/BLD-FOR 
                                    

 

 04/10/2017                    OSCAR CORMIER MD                    Ot       
             Z34.82                    ENCOUNTER FOR SUPRVSN OF NORMAL PREGNANC
                                     

 

 04/10/2017                    OSCAR CORMIER MD                    Ot       
             Z3A.19                    19 WEEKS GESTATION OF PREGNANCY         
                            

 

 2017                    OSCAR CORMIER MD                    Ot       
             Z13.0                    ENCNTR SCREEN FOR DIS OF THE BLD/BLD-FOR 
                                    

 

 2017                    OSCAR CORMIER MD                    Ot       
             Z34.82                    ENCOUNTER FOR SUPRVSN OF NORMAL PREGNANC
                                     

 

 2017                    OSCAR CORMIER MD                    Ot       
             Z3A.19                    19 WEEKS GESTATION OF PREGNANCY         
                            

 

 2017                    OSCAR CORMIER MD                    Ot       
             Z34.92                    ENCNTR FOR SUPRVSN OF NORMAL PREG, UNSP,
                                     

 

 2017                    OSCAR CORMIER MD, Ot       
             Z36                    ENCOUNTER FOR  SCREENING OF MOT   
                                  

 

 2017                    OSCAR CORMIER MD, Ot       
             O44.43                    LOW LYING PLACENTA NOS OR WITHOUT HEMOR,
                                     

 

 2017                    OSCAR CORMIER MD, Ot       
             Z3A.30                    30 WEEKS GESTATION OF PREGNANCY         
                            

 

 2017                    OSCAR CORMIER MD, Ot       
             O99.810                    ABNORMAL GLUCOSE COMPLICATING PREGNANCY
                                     

 

 2017                    OSCAR CORMIER MD, Ot       
             O44.43                    LOW LYING PLACENTA NOS OR WITHOUT HEMOR,
                                     

 

 2017                    OSCAR CORMIER MD, Ot       
             Z3A.30                    30 WEEKS GESTATION OF PREGNANCY         
                            



                                                                               
                                                                               
                                                                               
                                                                               
                                                                               
                                                                               
                                                                               
                                                                               
                                                                               
                                                                               
                                                                               
                                                                               
                                                                               
                                                                               
                                                                               
                                                                               
                                                                               
                                                                               
                                                                               
                                                                               
                                                                               
                                                                               
                                                                               
                     



Procedures

                      





 Code                    Description                    Performed By           
         Performed On                

 

                     77538                                                     
     PREGNANCY TEST, URINE (IN-HOUSE)                                          
                 2014                

 

                     81739                                                     
     THERAPUTIC INJ SQ/IM                                                      
     10/13/2014                

 

                     35309                                                     
     TB TEST INTRADERMAL                                                       
    10/13/2014                

 

                     23052                                                     
     UA LONG DIP                                                                           

 

                     62812                                                     
     GC/CHLAM PROBE (STATE)                                                    
       2015                

 

                     06051                                                     
     PAP SMEAR                                                           2015                

 

                                                                          
     PAP SMEAR OBTAIN SMEAR                                                    
       2015                

 

                     10326                                                     
     TRICHOMONAS (IN-HOUSE)                                                    
       2015                



                                                                               
                                                                               



Results

                      





 Test                    Result                    Range                









 Capillary blood glucose measurement by glucometer (mass/volume) - 17 10:
21                









 Capillary blood glucose measurement by glucometer (mass/volume)               
     81 mg/dL                                    



                                                                              



Encounters

                      





 ACCT No.                    Visit Date/Time                    Discharge      
              Status                    Pt. Type                    Provider   
                 Facility                    Loc./Unit                    
Complaint                

 

 [id number]                    2017 13:03:00                    2017 23:59:59                    CLS                    Outpatient             
       OSCAR CORMIER MD                    [facility] 
                   RAD                    [place] LYING PLACENTA O44.42            
    

 

 [id number]                    2017 10:09:00                    2017 23:59:59                    CLS                    Outpatient             
       OSCAR CORMIER MD                    [facility] 
                   LAB                    O99.810                

 

 [id number]                    05/10/2017 11:10:00                    05/10/
2017 23:59:59                    CLS                    Outpatient             
       OSCAR CORMIER MD                    [facility] 
                   RAD                    Z36                

 

 [id number]                    2017 09:58:00                    2017 23:59:59                    CLS                    Outpatient             
       OSCAR CORMIER MD                    [facility] 
                   RAD                    FETAL SURVEY                

 

 [id number]                    2017 11:13:00                    2017 23:59:59                    CLS                    Outpatient             
       OSCAR CORMIER MD                    [facility] 
                   RAD                    DATING                

 

 [id number]                    10/25/2016 15:45:00                    10/25/
2016 23:59:59                    CLS                    Outpatient             
       RADHA ANDERSEN MD                    [facility]  
                  RAD                    FEMALE HIRSUTISM                

 

 [id number]                    2015 11:15:00                    2015 23:59:59                    CLS                    Outpatient             
       SHERI MACK MD                    [facility]   
                 RAD                                     

 

 [id number]                    2015 10:34:00                    2015 23:
59:59                    CLS                    Outpatient                    
ANA LUZ                                                           
                    

 

 [id number]                    2015 15:24:00                    2015 23:
59:59                    CLS                    Outpatient                    
SHWETA KRAMER                                                         
                      

 

 [id number]                    10/13/2014 16:11:00                    10/13/2014 23:
59:59                    CLS                    Outpatient                    
SONIA COSME DO                                                               
                

 

 [id number]                    2014 10:30:00                    2014 23:
59:59                    CLS                    Outpatient                    
SHWETA KRAMER                                                         
                      

 

 [id number]                    2013 10:35:00                    2013 23:
59:59                    CLS                    Outpatient                    
SHWETA KRAMER                                                         
                      

 

 [id number]                    2013 14:26:00                    2013 23:
59:59                    CLS                    Outpatient                     
                                                                               

 

 [id number]                    2013 13:26:00                    2013 23:
59:59                    CLS                    Outpatient

## 2017-08-23 NOTE — LABOR PROGRESS NOTE
Labor Progress Note


Labor Progress Note


Date Seen by Provider:  Aug 23, 2017


Time Seen by Provider:  08:48


Subjective:


Pt denies complaints.  Contractions becoming stronger - on Pit 6





Objective:


(Can we insert 24 hour vitals here?)


Cervical exam: 4cm/70/-2


Consistency: soft


Position: anterior


Presentation: vtx


Fetal heart tones: 140s beats per minute, good variability, [x] reactive


Tocometer: irregular ctx





Assessment/Plan:


Jennyfer Hernandez  is a 32  /Para   / ,Gestational Age 39wks 6d here 

for IOL.


AROM perfomed w/ amniohook, clear fluid, FHT reassuring


CEFM/TOCO


Continue pitocin per protocol


Anesthesia: anticipate epidural


Anticipate vaginal delivery.





Vitals - Labs


Vital Signs - I&O





Vital Signs








  Date Time  Temp Pulse Resp B/P (MAP) Pulse Ox O2 Delivery O2 Flow Rate FiO2


 


17 07:40  83 18 102/55  Room Air  


 


17 07:25 98.6 94 18 120/68  Room Air  


 


17 07:00 98.7 91 18 124/72  Room Air  











Labs


Laboratory Tests


17 06:46: 


White Blood Count 10.2, Red Blood Count 4.60, Hemoglobin 12.3, Hematocrit 37, 

Mean Corpuscular Volume 81, Mean Corpuscular Hemoglobin 27, Mean Corpuscular 

Hemoglobin Concent 33, Red Cell Distribution Width 16.7H, Platelet Count 190, 

Mean Platelet Volume 9.4, Neutrophils (%) (Auto) 66, Lymphocytes (%) (Auto) 22, 

Monocytes (%) (Auto) 11, Eosinophils (%) (Auto) 1, Basophils (%) (Auto) 0, 

Neutrophils # (Auto) 6.7, Lymphocytes # (Auto) 2.3, Monocytes # (Auto) 1.1H, 

Eosinophils # (Auto) 0.1, Basophils # (Auto) 0.0











SONIA COSME DO Aug 23, 2017 08:51

## 2017-08-23 NOTE — XMS REPORT
[facility]

 Created on: 2016



Jennyfer Aaron

External Reference #: [id number]

: 1985

Sex: Female



Demographics







 Address  [address] [place]  [address]

 

 Home Phone  [phone]

 

 Preferred Language  Unknown

 

 Marital Status  Unknown

 

 Druze Affiliation  Unknown

 

 Race  

 

 Ethnic Group   or 





Author







 Author  SHWETA LOREDO

 

 Organization  [employer]

 

 Address  Unknown

 

 Phone  Unavailable







Care Team Providers







 Care Team Member Name  Role  Phone

 

 SHWETA LOREDO  CP  Unavailable



                                                                



Allergies

          No Known Allergies                                                   
                                     



Problems

          





 Problem Type  Condition  Code  Onset Dates  Condition Status

 

 Problem  Irregular periods  N92.6     Active

 

 Problem  Iron (Fe) deficiency anemia  D50.9     Active

 

 Problem  Chest pain  R07.9     Active

 

 Assessment  Otalgia of left ear  H92.02     Active

 

 Problem  Wellness examination  Z00.00     Active

 

 Assessment  Eustachian tube dysfunction, left  H69.82     Active



                                                                               
                                                           



Medications

          





 Medication  Code System  Code  Instructions  Start Date  End Date  Status  
Dosage

 

 Flonase Allergy Relief  NDC  [address]-33  50 MCG/ACT Nasally twice a day x 
14 days then prn           1 spray in each nostril

 

 Iron  NDC  91625-99487  325 (65 Fe) MG Orally Once a day           1 tablet



                                                                               
                   



Procedures

          





 Procedure  Coding System  Code  Date

 

 Office Visit, Est Pt., Level 3  CPT-4  56674  2016



                                                                               
                   



Vital Signs

          





 Date/Time:  2016

 

 Cardiac Monitoring Heart Rate  72 bpm

 

 Weight  167.4 lbs

 

 Height  60 in

 

 BMI  32.69 Index

 

 Blood Pressure Diastolic  70 mmHg

 

 Blood Pressure Systolic  114 mmHg



                                                                              



Results

          No Known Results                                                     
               



Summary Purpose

          [employer] Submission

## 2017-08-23 NOTE — XMS REPORT
[facility]

 Created on: 2016



Jennyfer Aaron

External Reference #: [id number]

: 1985

Sex: Female



Demographics







 Address  [address] [place]  [address]

 

 Home Phone  [phone]

 

 Preferred Language  Unknown

 

 Marital Status  Unknown

 

 Rastafari Affiliation  Unknown

 

 Race  

 

 Ethnic Group   or 





Author







 OLVIN Meredith

 

 Organization  [employer]

 

 Address  Unknown

 

 Phone  Unavailable







Care Team Providers







 Care Team Member Name  Role  Phone

 

 OLVIN DUNN  CP  Unavailable



                                                                



Allergies, Adverse Reactions, Alerts

          





 Substance  Reaction  Event Type

 

 N.K.D.A.  Info Not Available  Non Drug Allergy



                                                                               
         



Problems

          





 Problem Type  Condition  Code  Onset Dates  Condition Status

 

 Assessment  Wellness examination  Z00.00     Active

 

 Problem  Irregular periods  N92.6     Active

 

 Problem  Iron (Fe) deficiency anemia  D50.9     Active

 

 Problem  Chest pain  R07.9     Active

 

 Assessment  Irregular periods  N92.6     Active

 

 Assessment  Iron (Fe) deficiency anemia  D50.9     Active

 

 Problem  Wellness examination  Z00.00     Active

 

 Assessment  Chest pain  R07.9     Active



                                                                               
                                                                               



Medications

          No Known Medications                                                 
                                       



Procedures

          





 Procedure  Coding System  Code  Date

 

 ELECTROCARDIOGRAM, TRACING  CPT-4  03561  2016

 

 Office Visit, Est Pt., Level 4  CPT-4  48294  2016

 

 ASSAY OF INSULIN  CPT-4  55633  2016



                                                                               
                                       



Vital Signs

          





 Date/Time:  2016

 

 Temperature  97.8 F

 

 Weight  170.6 lbs

 

 Height  60 in

 

 BMI  33.31 Index

 

 Blood Pressure Diastolic  76 mmHg

 

 Blood Pressure Systolic  124 mmHg

 

 Cardiac Monitoring Heart Rate  76 bpm



                                                                    



Results

          





 Name  Result  Date  Reference Range  Unit  Abnormality Flag

 

 EKG, TRACING (IN-HOUSE)               



                                                                    



Summary Purpose

          [employer] Submission

## 2017-08-23 NOTE — HISTORY & PHYSICAL-OB
OB - Chief Complaint & HPI


Date/Time


Date of Admission:


Date of Admission:  Aug 23, 2017 at 6:06 am


Time Seen by Provider:  13:00





Chief Complaint/History


OB-Reason for Admission/Chief:  Induction of Labor


Hx :  2


Hx Para:  1


Expected Date of Delivery:  Aug 24, 2017


Gestational Age in Weeks:  41


Gestational Age in Days:  8


History of Labs


B+, antibody neg, RI, HIV/HepB/RPR NR, GC/chlamydia neg, 1 hour glucola normal, 

GBS neg





Allergies and Home Medications


Allergies


Coded Allergies:  


     No Known Drug Allergies (Unverified , 12)





Home Medications


Ferrous Sulfate 325 Mg Tablet, 1 TAB PO BID, (Reported)


Prenatal Vits W-Ca,Fe,Fa(<1MG) 1 Each Tablet, 1 TAB PO DAILY, (Reported)





OB - History


Hx of Present Pregnancy


Prenatal Care:  Yes


Ultrasounds:  Normal mid trimester US


Obstetrical Complications:  None


Medical Complications:  None





Prenatal Information


Pregnancy Induced Hypertension:  No


Maternal Gestational Diabetes:  No


Postpartum Hemorrhage:  No





Obstetrical History


Hx :  2


Hx Para:  1


Hx # Term Pregnancies:  1


Hx #  Pregnancies:  0


Number of Living Children:  1


Hx Pregnancy Termination:  No


Hx Multiple Gestation:  No


Hx Stillbirth:  No


Hx Pregnancy Complication:  No


Hx Pregnancy Induced Hypertens:  Yes


Hx Maternal Gestational Diabet:  No


Hx Postpartum Hemorrhage:  No





Delivery History


Hx Dystocia:  No


Hx Forceps Assisted Delivery:  No


Hx Vacuum Extraction Assisted:  No


Hx Placenta Abnormality:  No


Hx Fetal Distress:  No


Hx Large For Gestational Age I:  Yes


Hx Small for Gestational Age I:  No


Hx  Section:  No


Hx Vaginal Delivery Post C-Sec:  No


Hx Blood Disorders:  No


Adverse Rxn to Tranfusion:  No





Patient Past Medical History





PMHx: none





Social History/Family History


HIV/AIDS:  No


Recent Infectious Disease Expo:  No


Sexually Transmitted Disease:  No


Alcohol Use:  Denies Use


Recreational Drug Use:  No


Smoking Cessation:  Never smoker





Immunizations


Tetanus Booster (TDap):  Less than 5yrs


Date of Influenza Vaccine:  Oct 31, 2011


Rubella:  immune


RPR/VDRL:  Negative


GBS Status:  Negative


HBsAG:  Negative





OB - Admission Exam


Physical Exam


Date Seen by Provider:  Aug 23, 2017


Time Seen by Provider:  13:00


Vitals:





Vital Signs








 17





 10:30 13:45


 


Temp 98.1 


 


Pulse  73


 


Resp  18


 


B/P (MAP)  117/69


 


Pulse Ox  99


 


O2 Delivery  Room Air








HEENT:  NCAT


Cervical Dilatation:  4cm


Labs





Laboratory Tests








Test


  17


06:46 Range/Units


 


 


White Blood Count


  10.2 


  4.3-11.0


10^3/uL


 


Red Blood Count


  4.60 


  4.35-5.85


10^6/uL


 


Hemoglobin 12.3  11.5-16.0  G/DL


 


Hematocrit 37  35-52  %


 


Mean Corpuscular Volume 81  80-99  FL


 


Mean Corpuscular Hemoglobin 27  25-34  PG


 


Mean Corpuscular Hemoglobin


Concent 33 


  32-36  G/DL


 


 


Red Cell Distribution Width 16.7 H 10.0-14.5  %


 


Platelet Count


  190 


  130-400


10^3/uL


 


Mean Platelet Volume 9.4  7.4-10.4  FL


 


Neutrophils (%) (Auto) 66  42-75  %


 


Lymphocytes (%) (Auto) 22  12-44  %


 


Monocytes (%) (Auto) 11  0-12  %


 


Eosinophils (%) (Auto) 1  0-10  %


 


Basophils (%) (Auto) 0  0-10  %


 


Neutrophils # (Auto) 6.7  1.8-7.8  X 10^3


 


Lymphocytes # (Auto) 2.3  1.0-4.0  X 10^3


 


Monocytes # (Auto) 1.1 H 0.0-1.0  X 10^3


 


Eosinophils # (Auto)


  0.1 


  0.0-0.3


10^3/uL


 


Basophils # (Auto)


  0.0 


  0.0-0.1


10^3/uL











OB - Assessment/Plan/Diagnosis


Assessment


Assessment:  induction of labor, rupture of membranes





Plan


Plan:  Induction


Induction Method:  per Misoprostol Protocol











OSCAR CORMIER MD Aug 23, 2017 2:46 pm

## 2017-08-23 NOTE — XMS REPORT
[facility]

 Created on: 2016



AaronJennyfer long

External Reference #: [id number]

: 1985

Sex: Female



Demographics







 Address  [address] [place]  [address]

 

 Home Phone  [phone]

 

 Preferred Language  Unknown

 

 Marital Status  Unknown

 

 [employer] Affiliation  Unknown

 

 Race  

 

 Ethnic Group   or 





Author







 OLVIN Meredith

 

 [facility]  [employer]

 

 Address  Unknown

 

 Phone  Unavailable







Care Team Providers







 Care Team Member Name  Role  Phone

 

 OLVIN DUNN    Unavailable



                                                                



Allergies

          No Known Allergies                                                   
                                     



Problems

          





 Problem Type  Condition  Code  Onset Dates  Condition Status

 

 Problem  Irregular periods  N92.6     Active

 

 Problem  Iron (Fe) deficiency anemia  D50.9     Active

 

 Problem  Chest pain  R07.9     Active

 

 Problem  Wellness examination  Z00.00     Active



                                                                               
                                       



Medications

          





 Medication  Code System  Code  Instructions  Start Date  End Date  Status  
Dosage

 

 Atorvastatin Calcium  NDC  [address]-62  10 mg Orally Once a day  2016        1 tablet



                                                                              



Results

          No Known Results                                                     
               



Summary Purpose

          [employer] Submission

## 2017-08-23 NOTE — XMS REPORT
[facility]

 Created on: 2016



Jennyfer Aaron

External Reference #: [id number]

: 1985

Sex: Female



Demographics







 Address  [address] [place]  [address]

 

 Home Phone  [phone]

 

 Preferred Language  Unknown

 

 Marital Status  Unknown

 

 Church Affiliation  Unknown

 

 Race  

 

 Ethnic Group   or 





Author







 Author  KATHLEEN BEAVER

 

 Organization  [employer]

 

 Address  Unknown

 

 Phone  Unavailable







Care Team Providers







 Care Team Member Name  Role  Phone

 

 KATHLEEN BEAVER  CP  Unavailable



                                                                



Allergies, Adverse Reactions, Alerts

          





 Substance  Reaction  Event Type

 

 N.K.D.A.  Info Not Available  Non Drug Allergy



                                                                               
         



Problems

          





 Problem Type  Condition  Code  Onset Dates  Condition Status

 

 Problem  Irregular periods  N92.6     Active

 

 Problem  Iron (Fe) deficiency anemia  D50.9     Active

 

 Problem  Chest pain  R07.9     Active

 

 Problem  Wellness examination  Z00.00     Active

 

 Assessment  Acute suppurative otitis media of left ear without spontaneous 
rupture of tympanic membrane, recurrence not specified  H66.002     Active



                                                                               
                                                 



Medications

          





 Medication  Code System  Code  Instructions  Start Date  End Date  Status  
Dosage

 

 Iron  NDC  68223-16315  325 (65 Fe) MG Orally Once a day           1 tablet

 

 Ciprodex  NDC  [address]-55  0.3-0.1 % Otic Twice a day  2016        
4 drops into left ear

 

 Flonase Allergy Relief  NDC  [address]-01  50 MCG/ACT Nasally twice a day x 
14 days then prn           1 spray in each nostril

 

 Folic Acid  NDC  [address]-91  1 MG Orally Once a day           1 tablet

 

 Amoxicillin  NDC  [address]-80  500 MG Orally every 12 hrs       1 capsule



                                                                               
                                                 



Procedures

          





 Procedure  Coding System  Code  Date

 

 Office Visit, Est Pt., Level 3  CPT-4  51109  2016



                                                                               
                   



Vital Signs

          





 Date/Time:  2016

 

 Cardiac Monitoring Heart Rate  84 bpm

 

 Weight  162.5 lbs

 

 Height  60 in

 

 BMI  31.73 Index

 

 Blood Pressure Diastolic  62 mmHg

 

 Blood Pressure Systolic  100 mmHg



                                                                              



Results

          No Known Results                                                     
               



Summary Purpose

          [employer] Submission

## 2017-08-24 VITALS — DIASTOLIC BLOOD PRESSURE: 66 MMHG | SYSTOLIC BLOOD PRESSURE: 115 MMHG

## 2017-08-24 VITALS — SYSTOLIC BLOOD PRESSURE: 122 MMHG | DIASTOLIC BLOOD PRESSURE: 68 MMHG

## 2017-08-24 VITALS — DIASTOLIC BLOOD PRESSURE: 68 MMHG | SYSTOLIC BLOOD PRESSURE: 120 MMHG

## 2017-08-24 VITALS — DIASTOLIC BLOOD PRESSURE: 70 MMHG | SYSTOLIC BLOOD PRESSURE: 131 MMHG

## 2017-08-24 VITALS — SYSTOLIC BLOOD PRESSURE: 113 MMHG | DIASTOLIC BLOOD PRESSURE: 70 MMHG

## 2017-08-24 LAB
BASOPHILS # BLD AUTO: 0 10^3/UL (ref 0–0.1)
BASOPHILS NFR BLD AUTO: 0 % (ref 0–10)
EOSINOPHIL # BLD AUTO: 0.1 10^3/UL (ref 0–0.3)
EOSINOPHIL NFR BLD AUTO: 1 % (ref 0–10)
ERYTHROCYTE [DISTWIDTH] IN BLOOD BY AUTOMATED COUNT: 16.8 % (ref 10–14.5)
LYMPHOCYTES # BLD AUTO: 2.2 X 10^3 (ref 1–4)
LYMPHOCYTES NFR BLD AUTO: 19 % (ref 12–44)
MCH RBC QN AUTO: 27 PG (ref 25–34)
MCHC RBC AUTO-ENTMCNC: 33 G/DL (ref 32–36)
MCV RBC AUTO: 82 FL (ref 80–99)
MONOCYTES # BLD AUTO: 1.2 X 10^3 (ref 0–1)
MONOCYTES NFR BLD AUTO: 10 % (ref 0–12)
NEUTROPHILS # BLD AUTO: 8.4 X 10^3 (ref 1.8–7.8)
NEUTROPHILS NFR BLD AUTO: 71 % (ref 42–75)
PLATELET # BLD: 179 10^3/UL (ref 130–400)
PMV BLD AUTO: 9.9 FL (ref 7.4–10.4)
RBC # BLD AUTO: 4.29 10^6/UL (ref 4.35–5.85)
WBC # BLD AUTO: 11.9 10^3/UL (ref 4.3–11)

## 2017-08-24 RX ADMIN — IBUPROFEN SCH MG: 600 TABLET ORAL at 13:13

## 2017-08-24 RX ADMIN — IBUPROFEN SCH MG: 600 TABLET ORAL at 05:00

## 2017-08-24 NOTE — ANESTHESIA-REGIONAL POST-OP
Regional


Patient Condition


Mental Status:  Alert, Oriented x3


Circulation:  Same as Pre-Op


Headache:  Absent


Sensation:  Full Recovery


Motor Block:  Absent





Post Op Complications


Complications


None





Follow Up Care/Instructions


Patient Instructions


None needed.





Anesthesia/Patient Condition


Patient is doing well, no complaints, stable vital signs, no apparent adverse 

anesthesia problems.   


No complications reported per nursing.











PATRICIA HOANG CRNA Aug 24, 2017 13:04

## 2017-08-24 NOTE — DISCHARGE SUMMARY
Diagnosis/Chief Complaint


Date of Admission


Aug 23, 2017 at 06:06


Date of Discharge


Aug 24, 2017


Admission Diagnosis


Admission Diagnosis


 IOL at 39w6d





Discharge Diagnosis


s/p  17 at 39w6d


1st degree perineal and bilateral periurethral lacerations





Discharge Summary-OBS


Procedures


None.


Discharge Physical Examination


Allergies:  


Coded Allergies:  


     No Known Drug Allergies (Unverified , 12)


Vitals & I&Os





Vital Sign - Last 12Hours








  Date Time  Temp Pulse Resp B/P (MAP) Pulse Ox O2 Delivery O2 Flow Rate FiO2


 


17 04:00 98.1 72 18 120/68 97 Room Air  








General Appearance:  Alert, Oriented X3, Cooperative


Abdominal:  Soft, Other (uterus firm)





Hospital Course


Routine postpartum care


Labs


Laboratory Tests


17 06:07: 


White Blood Count 11.9H, Red Blood Count 4.29L, Hemoglobin 11.5, Hematocrit 35, 

Mean Corpuscular Volume 82, Mean Corpuscular Hemoglobin 27, Mean Corpuscular 

Hemoglobin Concent 33, Red Cell Distribution Width 16.8H, Platelet Count 179, 

Mean Platelet Volume 9.9, Neutrophils (%) (Auto) 71, Lymphocytes (%) (Auto) 19, 

Monocytes (%) (Auto) 10, Eosinophils (%) (Auto) 1, Basophils (%) (Auto) 0, 

Neutrophils # (Auto) 8.4H, Lymphocytes # (Auto) 2.2, Monocytes # (Auto) 1.2H, 

Eosinophils # (Auto) 0.1, Basophils # (Auto) 0.0





Discharge


Instructions to patient/family


Please see electonic discharge instructions given to patient.


Discharge Medications


Reviewed and agree with Discharge Medication list on patient's Discharge 

Instruction sheet





Clinical Quality Measures


DVT/VTE Risk/Contraindication:


Risk Factor Score Per Nursin


RFS Level Per Nursing on Admit:  1=Low/No VTE PPX





Copy


Copies To 1:   OSCAR CORMIER MD, LINDA K DO Aug 24, 2017 08:52

## 2017-08-24 NOTE — DISCHARGE INSTRUCTIONS
Discharge Inst-[facility]


Depart Medications


New, Converted or Re-Newed RX:  Other (will take OTC)


New Medications:  


Ibuprofen (Ibuprofen) 600 Mg Tablet


600 MG PO Q6H PRN for CRAMPS, #90 TAB





 


Continued Medications:  


Prenatal Vits W-Ca,Fe,Fa(<1MG) (Prenatal) 1 Each Tablet


1 TAB PO DAILY





 


Discontinued Medications:  


Ferrous Sulfate (Iron) 325 Mg Tablet


1 TAB PO BID, TAB











Follow Up/Instructions


Goal/Follow Up:  


Follow-up with Dr. El in 6wk





Activity


Activity:  Activity as Tolerated


Nothing Inside Vagina:  No Douching, No [place], No Tampons





Diet


Discharge Diet:  No Restrictions


Symptoms to Report to DrBrayan:  Bleeding Excessive, Pain Increased, Fever Over 101 

Degrees F, Vaginal Bleeding Increase, Vaginal Discharge Foul, Shortness of 

Breath


For Any Problems or Questions:  Contact Your Physician











SONIA COSME DO Aug 24, 2017 08:55

## 2019-01-17 ENCOUNTER — HOSPITAL ENCOUNTER (OUTPATIENT)
Dept: HOSPITAL 75 - REHAB | Age: 34
LOS: 28 days | Discharge: HOME | End: 2019-02-14
Attending: NURSE PRACTITIONER
Payer: COMMERCIAL

## 2019-01-17 DIAGNOSIS — M53.3: Primary | ICD-10-CM
